# Patient Record
Sex: FEMALE | Employment: OTHER | URBAN - METROPOLITAN AREA
[De-identification: names, ages, dates, MRNs, and addresses within clinical notes are randomized per-mention and may not be internally consistent; named-entity substitution may affect disease eponyms.]

---

## 2020-04-04 ENCOUNTER — APPOINTMENT (OUTPATIENT)
Dept: GENERAL RADIOLOGY | Age: 70
End: 2020-04-04
Attending: EMERGENCY MEDICINE
Payer: MEDICARE

## 2020-04-04 ENCOUNTER — HOSPITAL ENCOUNTER (EMERGENCY)
Age: 70
Discharge: HOME | End: 2020-04-04
Attending: EMERGENCY MEDICINE
Payer: MEDICARE

## 2020-04-04 VITALS
SYSTOLIC BLOOD PRESSURE: 123 MMHG | HEART RATE: 81 BPM | RESPIRATION RATE: 16 BRPM | OXYGEN SATURATION: 99 % | WEIGHT: 124.12 LBS | DIASTOLIC BLOOD PRESSURE: 81 MMHG | BODY MASS INDEX: 20.68 KG/M2 | TEMPERATURE: 99.6 F | HEIGHT: 65 IN

## 2020-04-04 DIAGNOSIS — S52.91XA CLOSED FRACTURE OF DISTAL END OF RIGHT FOREARM, INITIAL ENCOUNTER: Primary | ICD-10-CM

## 2020-04-04 PROCEDURE — 99284 EMERGENCY DEPT VISIT MOD MDM: CPT

## 2020-04-04 PROCEDURE — 73110 X-RAY EXAM OF WRIST: CPT

## 2020-04-04 PROCEDURE — 74011000250 HC RX REV CODE- 250: Performed by: EMERGENCY MEDICINE

## 2020-04-04 PROCEDURE — 75810000302 HC ER LEVEL 2 CLOSED TREATMNT FRACTURE/DISLOCATION

## 2020-04-04 RX ORDER — BUPIVACAINE HYDROCHLORIDE 5 MG/ML
5 INJECTION, SOLUTION EPIDURAL; INTRACAUDAL ONCE
Status: COMPLETED | OUTPATIENT
Start: 2020-04-04 | End: 2020-04-04

## 2020-04-04 RX ORDER — HYDROCODONE BITARTRATE AND ACETAMINOPHEN 5; 325 MG/1; MG/1
1 TABLET ORAL
Qty: 8 TAB | Refills: 0 | Status: SHIPPED | OUTPATIENT
Start: 2020-04-04 | End: 2020-04-07

## 2020-04-04 RX ORDER — LIDOCAINE HYDROCHLORIDE 10 MG/ML
5 INJECTION, SOLUTION EPIDURAL; INFILTRATION; INTRACAUDAL; PERINEURAL ONCE
Status: COMPLETED | OUTPATIENT
Start: 2020-04-04 | End: 2020-04-04

## 2020-04-04 RX ADMIN — LIDOCAINE HYDROCHLORIDE 5 ML: 10 INJECTION, SOLUTION EPIDURAL; INFILTRATION; INTRACAUDAL; PERINEURAL at 20:24

## 2020-04-04 RX ADMIN — BUPIVACAINE HYDROCHLORIDE 25 MG: 5 INJECTION, SOLUTION EPIDURAL; INTRACAUDAL; PERINEURAL at 20:24

## 2020-04-04 NOTE — ED TRIAGE NOTES
Patient complains of right wrist pain after tripping over a rock causing her to fall and injure her right wrist.   Patient recently reports having a cold and treated by PCP with antibiotic

## 2020-04-04 NOTE — ED PROVIDER NOTES
HPI   52-year-old female presents after fall. Patient states she was walking outside with her grandson when she tripped and fell catching herself with her right hand. Patient is right-handed. Complains of pain 7/10 right posterior wrist, nonradiating worse, with range of motion. Patient also says she hit her chin on the ground. Denies head injury or loss of consciousness, denies neck pain, denies weakness numbness, denies malocclusion or dental injury. Patient denies any other complaints or injury. Patient states her tetanus is up-to-date. Past Medical History:   Diagnosis Date    Hypertension     Renal artery stenosis ([facility] [address]        History reviewed. No pertinent surgical history. History reviewed. No pertinent family history. Social History     Socioeconomic History    Marital status:      Spouse name: Not on file    Number of children: Not on file    Years of education: Not on file    Highest education level: Not on file   Occupational History    Not on file   Social Needs    Financial resource strain: Not on file    Food insecurity     Worry: Not on file     Inability: Not on file    Transportation needs     Medical: Not on file     Non-medical: Not on file   Tobacco Use    Smoking status: Never Smoker    Smokeless tobacco: Never Used   Substance and Sexual Activity    Alcohol use:  Yes    Drug use: Not on file    Sexual activity: Not on file   Lifestyle    Physical activity     Days per week: Not on file     Minutes per session: Not on file    Stress: Not on file   Relationships    Social connections     Talks on phone: Not on file     Gets together: Not on file     Attends Voodoo service: Not on file     Active member of club or organization: Not on file     Attends meetings of clubs or organizations: Not on file     Relationship status: Not on file    Intimate partner violence     Fear of current or ex partner: Not on file     Emotionally abused: Not on file Physically abused: Not on file     Forced sexual activity: Not on file   Other Topics Concern    Not on file   Social History Narrative    Not on file         ALLERGIES: Patient has no known allergies. Review of Systems   Constitutional: Negative for chills and fever. Respiratory: Negative for cough and shortness of breath. Gastrointestinal: Negative for abdominal pain, nausea and vomiting. Genitourinary: Negative for dysuria and hematuria. Musculoskeletal: Positive for arthralgias. Skin: Positive for wound. Neurological: Negative for weakness, numbness and headaches. All other systems reviewed and are negative. There were no vitals filed for this visit.          Physical Exam   Physical Examination: General appearance - alert, well appearing, and in no distress, oriented to person, place, and time and normal appearing weight  Eyes - pupils equal and reactive, extraocular eye movements intact  HEENT-superficial abrasion to chin, no malocclusion  Neck - supple, no significant adenopathy, no pain with rom, no midline tenderness  Neurological - alert, oriented, normal speech, no focal findings or movement disorder noted  Musculoskeletal - no joint tenderness, deformity or swelling, swelling and tenderness to dorsum of right wrist, NVI with strong radial pulses and cap refill <2 sec  Extremities - peripheral pulses normal, no pedal edema, no clubbing or cyanosis  Skin - normal coloration and turgor, no rashes, no suspicious skin lesions noted  MDM  Number of Diagnoses or Management Options  Closed fracture of distal end of right forearm, initial encounter:      Amount and/or Complexity of Data Reviewed  Tests in the radiology section of CPT®: ordered and reviewed  Discuss the patient with other providers: yes (ortho)  Independent visualization of images, tracings, or specimens: yes    Patient Progress  Patient progress: improved         JOINT/FRACTURE REDUCTION  Date/Time: 4/4/2020 9:00 PM  Performed by: Bekah Yates MD  Authorized by: Bekah Yates MD     Consent:     Consent obtained:  Written    Consent given by:  Patient    Risks discussed:  Pain and vascular damage    Alternatives discussed:  No treatment, delayed treatment, alternative treatment, observation and referral  Universal protocol:     Procedure explained and questions answered to patient or proxy's satisfaction: yes      Relevant documents present and verified: yes      Test results available and properly labeled: yes      Imaging studies available: yes      Required blood products, implants, devices, and special equipment available: yes      Site/side marked: yes      Immediately prior to procedure, a time out was called: yes      Patient identity confirmed:  Verbally with patient, arm band and provided demographic data  Injury:     Injury location:  Forearm    Forearm injury location:  R forearm    Forearm fracture type: distal radius and ulnar styloid    Pre-procedure assessment:     Neurological function: normal      Distal perfusion: normal      Range of motion: normal    Anesthesia (see MAR for exact dosages):      Anesthesia method:  Nerve block and local infiltration    Local anesthetic:  Bupivacaine 0.5% w/o epi and lidocaine 1% w/o epi    Block location:  Hematoma block     Block needle gauge:  25 G    Block anesthetic:  Lidocaine 1% w/o epi and bupivacaine 0.5% w/o epi    Block technique:  Hematoma block    Block injection procedure:  Anatomic landmarks identified, incremental injection and anatomic landmarks palpated    Block outcome:  Anesthesia achieved  Procedure details:     Manipulation performed: yes      Skin traction used: yes      Skeletal traction used: yes      Pin inserted: no      Reduction successful: yes      X-ray confirmed reduction: yes      Immobilization:  Sling and splint    Splint type:  Sugar tong    Supplies used:  Elastic bandage, cotton padding and Ortho-Glass  Post-procedure assessment:     Neurological function: normal      Distal perfusion: normal      Range of motion: normal      Patient tolerance of procedure: Tolerated well, no immediate complications  Splint, Sugar Tong  Date/Time: 4/4/2020 9:00 PM  Performed by: Carley Fisher MD  Authorized by: Carley Fisher MD     Consent:     Consent obtained:  Verbal    Consent given by:  Patient    Risks discussed:  Discoloration, numbness, pain and swelling    Alternatives discussed:  No treatment, delayed treatment, alternative treatment, observation and referral  Universal protocol:     Procedure explained and questions answered to patient or proxy's satisfaction: yes      Relevant documents present and verified: yes      Test results available and properly labeled: yes      Imaging studies available: yes      Required blood products, implants, devices, and special equipment available: yes      Site/side marked: yes      Immediately prior to procedure a time out was called: yes      Patient identity confirmed:  Verbally with patient, arm band and provided demographic data  Pre-procedure details:     Sensation:  Normal  Procedure details:     Laterality:  Right    Location:  Arm    Arm:  R lower arm    Strapping: no      Splint type:  Sugar tong    Supplies:  Elastic bandage, Ortho-Glass and cotton padding  Post-procedure details:     Pain:  Improved    Sensation:  Normal    Patient tolerance of procedure: Tolerated well, no immediate complications      Discussed with Dr. Felecia Enciso ortho. After successful reduction patient will follow-up with Dr. Kimberly Ace.

## 2020-04-05 NOTE — ED NOTES
Splint applied by dr Johanna Pagan. The patient was discharged home by  Dr Adryan Rosales in stable condition. The patient is alert and oriented, in no respiratory distress and discharge vital signs obtained. The patient's diagnosis, condition and treatment were explained. The patient expressed understanding. One prescription was given. No work/school note given. A discharge plan has been developed. A  was not involved in the process. Aftercare instructions were given. Pt ambulatory out of the ED.

## 2020-04-05 NOTE — DISCHARGE INSTRUCTIONS
Patient Education        Broken Arm: Care Instructions  Your Care Instructions  Fractures can range from a small, hairline crack, to a bone or bones broken into two or more pieces. Your treatment depends on how bad the break is. Your doctor may have put your arm in a splint or cast to allow it to heal or to keep it stable until you see another doctor. It may take weeks or months for your arm to heal. You can help your arm heal with some care at home. You heal best when you take good care of yourself. Eat a variety of healthy foods, and don't smoke. You may have had a sedative to help you relax. You may be unsteady after having sedation. It can take a few hours for the medicine's effects to wear off. Common side effects of sedation include nausea, vomiting, and feeling sleepy or tired. The doctor has checked you carefully, but problems can develop later. If you notice any problems or new symptoms, get medical treatment right away. Follow-up care is a key part of your treatment and safety. Be sure to make and go to all appointments, and call your doctor if you are having problems. It's also a good idea to know your test results and keep a list of the medicines you take. How can you care for yourself at home? · If the doctor gave you a sedative:  ? For 24 hours, don't do anything that requires attention to detail, such as going to work, making important decisions, or signing any legal documents. It takes time for the medicine's effects to completely wear off.  ? For your safety, do not drive or operate any machinery that could be dangerous. Wait until the medicine wears off and you can think clearly and react easily. · Put ice or a cold pack on your arm for 10 to 20 minutes at a time. Try to do this every 1 to 2 hours for the next 3 days (when you are awake). Put a thin cloth between the ice and your cast or splint. Keep the cast or splint dry. · Follow the cast care instructions your doctor gives you.  If you have a splint, do not take it off unless your doctor tells you to. · Be safe with medicines. Take pain medicines exactly as directed. ? If the doctor gave you a prescription medicine for pain, take it as prescribed. ? If you are not taking a prescription pain medicine, ask your doctor if you can take an over-the-counter medicine. · Prop up your arm on pillows when you sit or lie down in the first few days after the injury. Keep the arm higher than the level of your heart. This will help reduce swelling. · Follow instructions for exercises to keep your arm strong. · Wiggle your fingers and wrist often to reduce swelling and stiffness. When should you call for help? Call 911 anytime you think you may need emergency care. For example, call if:    · You are very sleepy and you have trouble waking up.    Call your doctor now or seek immediate medical care if:    · You have new or worse nausea or vomiting.     · You have new or worse pain.     · Your hand or fingers are cool or pale or change color.     · Your cast or splint feels too tight.     · You have tingling, weakness, or numbness in your hand or fingers.    Watch closely for changes in your health, and be sure to contact your doctor if:    · You do not get better as expected.     · You have problems with your cast or splint. Where can you learn more? Go to http://susan-starr.info/  Enter N123 in the search box to learn more about \"Broken Arm: Care Instructions. \"  Current as of: June 26, 2019Content Version: 12.4  © 7399-1526 Healthwise, Incorporated. Care instructions adapted under license by [employer] (which disclaims liability or warranty for this information). If you have questions about a medical condition or this instruction, always ask your healthcare professional. [address] any warranty or liability for your use of this information.                 We hope that we have addressed all of your medical concerns. The examination and treatment you received in the Emergency Department were for an emergent problem and were not intended as complete care. It is important that you follow up with your healthcare provider(s) for ongoing care. If your symptoms worsen or do not improve as expected, and you are unable to reach your usual health care provider(s), you should return to the Emergency Department. Today's healthcare is undergoing tremendous change, and patient satisfaction surveys are one of the many tools to assess the quality of medical care. You may receive a survey from the [employer] regarding your experience in the Emergency Department. I hope that your experience has been completely positive, particularly the medical care that I provided. As such, please participate in the survey; anything less than excellent does not meet my expectations or intentions. [facility] and [address] [place] [address] participate in nationally recognized quality of care measures. If your blood pressure is greater than 120/80, as reported below, we urge that you seek medical care to address the potential of high blood pressure, commonly known as hypertension. Hypertension can be hereditary or can be caused by certain medical conditions, pain, stress, or \"white coat syndrome. \"       Please make an appointment with your health care provider(s) for follow up of your Emergency Department visit. VITALS:   Patient Vitals for the past 8 hrs:   Temp Pulse Resp BP SpO2   04/04/20 1956 99.6 °F (37.6 °C) 81 16 120/86 96 %          Thank you for allowing us to provide you with medical care today. We realize that you have many choices for your emergency care needs. Please choose us in the future for any continued health care needs. [address].   Office: [phone]            No results found for this or any previous visit (from the past 24 hour(s)). Xr Wrist Rt Ap/lat/obl Min 3v    Result Date: 4/4/2020  EXAM: XR WRIST RT AP/LAT/OBL MIN 3V INDICATION: post reduction. Acute right wrist fracture COMPARISON: Earlier same day. FINDINGS: Three  views of the right wrist demonstrate acute fractures of the distal radius and ulna. There is now neutral angulation of the radiocarpal joint post reduction. . There is associated soft tissue swelling. IMPRESSION: 1. Neutral angulation of the radiocarpal joint post reduction. Xr Wrist Rt Ap/lat/obl Min 3v    Result Date: 4/4/2020  EXAM: XR WRIST RT AP/LAT/OBL MIN 3V INDICATION: Trauma. Fall with right wrist pain COMPARISON: None. FINDINGS: 4  views of the right wrist demonstrate an acute transverse fracture through the distal radial metaphysis. The fracture does extend into the articular surface. There is impaction along the dorsal margin with resulting dorsal angulation of the radial carpal joint. There is a nondisplaced fracture of the ulnar styloid tip. There is soft tissue swelling. IMPRESSION: 1. Acute impacted intra-articular fracture of the distal radius 2. Nondisplaced fracture of the ulnar styloid tip.

## 2020-04-20 ENCOUNTER — HOSPITAL ENCOUNTER (OUTPATIENT)
Dept: PREADMISSION TESTING | Age: 70
Discharge: HOME | End: 2020-04-20
Payer: MEDICARE

## 2020-04-20 VITALS
OXYGEN SATURATION: 99 % | HEART RATE: 71 BPM | WEIGHT: 117.7 LBS | SYSTOLIC BLOOD PRESSURE: 126 MMHG | BODY MASS INDEX: 19.61 KG/M2 | RESPIRATION RATE: 20 BRPM | DIASTOLIC BLOOD PRESSURE: 73 MMHG | TEMPERATURE: 97.8 F | HEIGHT: 65 IN

## 2020-04-20 LAB
ANION GAP SERPL CALC-SCNC: 3 MMOL/L (ref 5–15)
BUN SERPL-MCNC: 18 MG/DL (ref 6–20)
BUN/CREAT SERPL: 30 (ref 12–20)
CALCIUM SERPL-MCNC: 8.8 MG/DL (ref 8.5–10.1)
CHLORIDE SERPL-SCNC: 105 MMOL/L (ref 97–108)
CO2 SERPL-SCNC: 32 MMOL/L (ref 21–32)
CREAT SERPL-MCNC: 0.6 MG/DL (ref 0.55–1.02)
GLUCOSE SERPL-MCNC: 86 MG/DL (ref 65–100)
POTASSIUM SERPL-SCNC: 3.6 MMOL/L (ref 3.5–5.1)
SODIUM SERPL-SCNC: 140 MMOL/L (ref 136–145)

## 2020-04-20 PROCEDURE — 93005 ELECTROCARDIOGRAM TRACING: CPT

## 2020-04-20 PROCEDURE — 80048 BASIC METABOLIC PNL TOTAL CA: CPT

## 2020-04-20 PROCEDURE — 36415 COLL VENOUS BLD VENIPUNCTURE: CPT

## 2020-04-20 RX ORDER — MELATONIN
1000
COMMUNITY

## 2020-04-20 RX ORDER — FLUTICASONE PROPIONATE 50 MCG
1 SPRAY, SUSPENSION (ML) NASAL
COMMUNITY

## 2020-04-20 NOTE — H&P
Preoperative Evaluation                     History and Physical with Surgical Risk Stratification     4/20/2020    CC: Fractured right wrist  Surgery: ORIF Right Wrist     HPI:   Magnus Benson is a 77 y.o. female referred for pre-operative evaluation by Dr. Ivonne Paez for surgery on 4/22/20. Ms. Diana Powers states she was walking on 4/4/20 and tripped and fell onto her right wrist. She was taken to [facility] ER where they reduced her wrist and placed her into a cast. She notes now the pain has reduced to a dull ache on some days. If she moves it wrong she will get a sharp pain. She has swelling in her fingers and finds it hard to bend them. She is RHD. The patient was evaluated in the surgeon's office and it was determined that the most appropriate plan of care is to proceed with surgical intervention. Patient's PCP Angus Phoenix MD    Review of Systems     Constitutional: Negative for chills and fever  HENT: Negative for congestion and sore throat  Eyes: negative for blurred vision and double vision  Respiratory: Negative for cough, shortness of breath and wheezing  Mouth: Negative for loose, broken or chipped teeth. Cardiovascular: Negative for chest pain and palpitations  Gastrointestinal: Negative for abdominal pain, constipation, diarrhea and nausea  Genitourinary: Negative for dysuria and hematuria  Musculoskeletal: Cast applied to right wrist, swollen fingers. Skin: Negative for rash, open wounds. Negative for bruises easily  Neurological: Negative for dizziness, tremors and headaches  Psychiatric: Negative for depression. The patient is not nervous/anxious.     Inherent Risk of Surgery     Surgical risk:  Intermediate  Low:  EIntermediate:   OrthopaedicHigh:    Patient Cardiac Risk Assessment     Revised Cardiac Risk Index (RCRI)    Rate if cardiac death, nonfatal MI, nonfatal cardiac arrest by number of risk factor- 0.4%    RODNEY/AHA 2007 Guidelines:   1) Surgery Emergency, Non-cardiac -> to surgery  2) If not, look at clinical predictors    Major Intermediate Minor   Abnormal EKG    Blood Thinner: NA    METS     >4 METS Climb a flight of stairs or a hill Walk on level ground at 4 mph Run a short distance Heavy work around house (scrub floors, move furniture)     Other Risk Factors:   Screening for ETOH use:  Done and low risk  Smoking status:   None    Personal or FH of bleeding problems:  No  Personal or FH of blood clots:  No  Personal or FH of anesthesia problems:   No    Pulmonary Risk:  Asthma or COPD:  No  Body mass index is 19.59 kg/m².   Known MARCO:  No  BUN normal    Past Medical, Surgical, Social History     Allergies: No Known Allergies    Past Medical History:   Diagnosis Date    Anxiety     Arthritis     Hypertension     Renal artery stenosis (HCC)     Wrist fracture, right 04/04/2020     Past Surgical History:   Procedure Laterality Date    HX CERVICAL FUSION  2012    S/P cervical fracture    HX COLONOSCOPY      HX TONSILLECTOMY      childhood     Social History     Tobacco Use    Smoking status: Never Smoker    Smokeless tobacco: Never Used   Substance Use Topics    Alcohol use: Yes     Comment: few drinks/month    Drug use: Never     Family History   Problem Relation Age of Onset    Deep Vein Thrombosis Neg Hx     Anesth Problems Neg Hx        Objective     Vitals:    04/20/20 0951   BP: 126/73   Pulse: 71   Resp: 20   Temp: 97.8 °F (36.6 °C)   SpO2: 99%   Weight: 53.4 kg (117 lb 11.2 oz)   Height: 5' 5\" (1.651 m)       Constitutional:  Appears well,  No Acute Distress, Vitals noted  Psychiatric:   Affect normal, Alert and Oriented to person/place/time    Eyes:   Pupils equally round and reactive, EOMI, conjunctiva clear, eyelids normal  ENT:   External ears and nose normal/lips, teeth normal, gums normal, TMs and Orophyarynx normal  Neck:   General inspection and Thyroid normal.  No abnormal cervical or supraclavicular nodes    Lungs:   Clear to auscultation, good respiratory effort  Heart: Ausculation normal.  Regular rhythm. No cardiac murmurs. No carotid bruits or palpable thrills  Chest wall normal  Musculoskeletal: Cast to wrist. Fingers on right hand 1+ edema  Extremities:  Good peripheral pulses  Skin:   Warm to palpation, without rashes, bruising, or suspicious lesions     Recent Results (from the past 72 hour(s))   METABOLIC PANEL, BASIC    Collection Time: 04/20/20 10:31 AM   Result Value Ref Range    Sodium 140 136 - 145 mmol/L    Potassium 3.6 3.5 - 5.1 mmol/L    Chloride 105 97 - 108 mmol/L    CO2 32 21 - 32 mmol/L    Anion gap 3 (L) 5 - 15 mmol/L    Glucose 86 65 - 100 mg/dL    BUN 18 6 - 20 MG/DL    Creatinine 0.60 0.55 - 1.02 MG/DL    BUN/Creatinine ratio 30 (H) 12 - 20      GFR est AA >60 >60 ml/min/1.73m2    GFR est non-AA >60 >60 ml/min/1.73m2    Calcium 8.8 8.5 - 10.1 MG/DL   EKG, 12 LEAD, INITIAL    Collection Time: 04/20/20 10:47 AM   Result Value Ref Range    Ventricular Rate 63 BPM    Atrial Rate 63 BPM    P-R Interval 174 ms    QRS Duration 86 ms    Q-T Interval 416 ms    QTC Calculation (Bezet) 425 ms    Calculated P Axis -88 degrees    Calculated R Axis 71 degrees    Calculated T Axis 60 degrees    Diagnosis       Unusual P axis, possible ectopic atrial rhythm  Abnormal ECG  When compared with ECG of 9/17/12  No significant change  Confirmed by Nimesh Forte MD., Mayito Saliva ([id number]) on 4/21/2020 9:39:37 AM         Assessment and Plan     Assessment/Plan:   1) Fractured right wrist  2) Pre-Operative Evaluation    Labs and EKG reviewed. Preoperative Clearance  Per RCRI, the patient has a 0.4% risk of cardiac death, nonfatal MI, nonfatal cardiac arrest based on no risk factors. Per ACC/AHA guidelines, patient is low risk for a(n) intermediate risk surgery and may proceed to planned surgery with the above noted risk.     Roxanne Chin NP

## 2020-04-20 NOTE — PERIOP NOTES
[address] , [address] [facility]   MAIN OR                                  [phone]   MAIN PRE OP                          [phone]                                                                                AMBULATORY PRE OP          [phone]  PRE-ADMISSION TESTING    [phone]   Surgery Date: Wednesday 4/22/20         Is surgery arrival time given by surgeon? NO  If NO, [facility] staff will call you between 3 and 7pm the day before your surgery with your arrival time. (If your surgery is on a Monday, we will call you the Friday before.)    Call [phone] after 7pm Monday-Friday if you did not receive this call. INSTRUCTIONS BEFORE YOUR SURGERY   When You  Arrive Arrive at the [address]nd [address] [facility] on the day of your surgery  Have your insurance card, photo ID, and any copayment (if needed)   Food   and   Drink NO food or drink after midnight the night before surgery    This means NO water, gum, mints, coffee, juice, etc.  No alcohol (beer, wine, liquor) 24 hours before and after surgery   Medications to   TAKE   Morning of Surgery MEDICATIONS TO TAKE THE MORNING OF SURGERY WITH A SIP OF WATER:    none   Medications  To  STOP      7 days before surgery  Non-Steroidal anti-inflammatory Drugs (NSAID's): for example, Ibuprofen (Advil, Motrin), Naproxen (Aleve)   Aspirin, if taking for pain    Herbal supplements, vitamins, and fish oil   Other:  (Pain medications not listed above, including Tylenol may be taken)   Blood  Thinners  If you take  Aspirin, Plavix, Coumadin, or any blood-thinning or anti-blood clot medicine, talk to the doctor who prescribed the medications for pre-operative instructions.    Bathing Clothing  Jewelry  Valuables      If you shower the morning of surgery, please do not apply anything to your skin (lotions, powders, deodorant, or makeup, especially mascara)   Follow Chlorhexidine Care Fusion body wash instructions provided to you during PAT appointment. Begin 3 days prior to surgery.  Do not shave or trim anywhere 24 hours before surgery   Wear your hair loose or down; no pony-tails, buns, or metal hair clips   Wear loose, comfortable, clean clothes   Wear glasses instead of contacts   Leave money, valuables, and jewelry, including body piercings, at home   Going Home - or Spending the Night  SAME-DAY SURGERY: You must have a responsible adult drive you home and stay with you 24 hours after surgery   ADMITS: If your doctor is keeping you in the hospital after surgery, leave personal belongings/luggage in your car until you have a hospital room number. Hospital discharge time is 12 noon  Drivers must be here before 12 noon unless you are told differently   Special Instructions Chlor-hex  wash reviewed, incentive spirometry/deep breathing, leg exercises to prevent DVT, s/s of infection & what to report to MD all reviewed with patient    Pt verified understanding by teach back and return demonstration     Follow all instructions so your surgery wont be cancelled. Please, be on time. If a situation occurs and you are delayed the day of surgery, call [phone] . If your physical condition changes (like a fever, cold, flu, etc.) call your surgeon. Home medication(s) reviewed and verified per patient list during PAT appointment. The patient was contacted  in person. The patient verbalizes understanding of all instructions and does not  need reinforcement.

## 2020-04-21 ENCOUNTER — ANESTHESIA EVENT (OUTPATIENT)
Dept: SURGERY | Age: 70
End: 2020-04-21
Payer: MEDICARE

## 2020-04-21 LAB
ATRIAL RATE: 63 BPM
CALCULATED P AXIS, ECG09: -88 DEGREES
CALCULATED R AXIS, ECG10: 71 DEGREES
CALCULATED T AXIS, ECG11: 60 DEGREES
DIAGNOSIS, 93000: NORMAL
P-R INTERVAL, ECG05: 174 MS
Q-T INTERVAL, ECG07: 416 MS
QRS DURATION, ECG06: 86 MS
QTC CALCULATION (BEZET), ECG08: 425 MS
VENTRICULAR RATE, ECG03: 63 BPM

## 2020-04-21 RX ORDER — NALOXONE HYDROCHLORIDE 0.4 MG/ML
0.2 INJECTION, SOLUTION INTRAMUSCULAR; INTRAVENOUS; SUBCUTANEOUS
Status: CANCELLED | OUTPATIENT
Start: 2020-04-21

## 2020-04-21 RX ORDER — HYDROMORPHONE HYDROCHLORIDE 1 MG/ML
.25-1 INJECTION, SOLUTION INTRAMUSCULAR; INTRAVENOUS; SUBCUTANEOUS
Status: CANCELLED | OUTPATIENT
Start: 2020-04-21

## 2020-04-21 RX ORDER — FLUMAZENIL 0.1 MG/ML
0.2 INJECTION INTRAVENOUS
Status: CANCELLED | OUTPATIENT
Start: 2020-04-21

## 2020-04-21 RX ORDER — SODIUM CHLORIDE, SODIUM LACTATE, POTASSIUM CHLORIDE, CALCIUM CHLORIDE 600; 310; 30; 20 MG/100ML; MG/100ML; MG/100ML; MG/100ML
125 INJECTION, SOLUTION INTRAVENOUS CONTINUOUS
Status: CANCELLED | OUTPATIENT
Start: 2020-04-21

## 2020-04-21 RX ORDER — LIDOCAINE HYDROCHLORIDE 10 MG/ML
0.1 INJECTION, SOLUTION EPIDURAL; INFILTRATION; INTRACAUDAL; PERINEURAL AS NEEDED
Status: CANCELLED | OUTPATIENT
Start: 2020-04-21

## 2020-04-21 RX ORDER — DIPHENHYDRAMINE HYDROCHLORIDE 50 MG/ML
12.5 INJECTION, SOLUTION INTRAMUSCULAR; INTRAVENOUS AS NEEDED
Status: CANCELLED | OUTPATIENT
Start: 2020-04-21 | End: 2020-04-21

## 2020-04-21 RX ORDER — SODIUM CHLORIDE, SODIUM LACTATE, POTASSIUM CHLORIDE, CALCIUM CHLORIDE 600; 310; 30; 20 MG/100ML; MG/100ML; MG/100ML; MG/100ML
125 INJECTION, SOLUTION INTRAVENOUS CONTINUOUS
Status: CANCELLED | OUTPATIENT
Start: 2020-04-21 | End: 2020-04-22

## 2020-04-21 NOTE — PERIOP NOTES
Only a portion of the patient's surgical orders were received via fax. The page was received incomplete and unable to read a majority of it. Left a VM suki Khoury at Dr. Gross Pulling office requesting that the orders be refaxed to PAT if before 1100 or to the Main pre-op if after 1100 today.   SZT:[id number]/89/7488

## 2020-04-22 ENCOUNTER — ANESTHESIA (OUTPATIENT)
Dept: SURGERY | Age: 70
End: 2020-04-22
Payer: MEDICARE

## 2020-04-22 ENCOUNTER — APPOINTMENT (OUTPATIENT)
Dept: GENERAL RADIOLOGY | Age: 70
End: 2020-04-22
Attending: ORTHOPAEDIC SURGERY
Payer: MEDICARE

## 2020-04-22 ENCOUNTER — HOSPITAL ENCOUNTER (OUTPATIENT)
Age: 70
Setting detail: OUTPATIENT SURGERY
Discharge: HOME | End: 2020-04-22
Attending: ORTHOPAEDIC SURGERY | Admitting: ORTHOPAEDIC SURGERY
Payer: MEDICARE

## 2020-04-22 VITALS
RESPIRATION RATE: 11 BRPM | BODY MASS INDEX: 18.68 KG/M2 | SYSTOLIC BLOOD PRESSURE: 106 MMHG | OXYGEN SATURATION: 100 % | HEART RATE: 69 BPM | DIASTOLIC BLOOD PRESSURE: 64 MMHG | TEMPERATURE: 97.8 F | WEIGHT: 112.25 LBS

## 2020-04-22 DIAGNOSIS — S52.551D CLOSED EXTRAARTICULAR FRACTURE OF DISTAL RADIUS, RIGHT, WITH ROUTINE HEALING, SUBSEQUENT ENCOUNTER: Primary | ICD-10-CM

## 2020-04-22 PROCEDURE — 76060000034 HC ANESTHESIA 1.5 TO 2 HR: Performed by: ORTHOPAEDIC SURGERY

## 2020-04-22 PROCEDURE — 77030000032 HC CUF TRNQT ZIMM -B: Performed by: ORTHOPAEDIC SURGERY

## 2020-04-22 PROCEDURE — 77030008829 HC WRE K ACMD -B: Performed by: ORTHOPAEDIC SURGERY

## 2020-04-22 PROCEDURE — 77030010777 HC CRDL FT DERY -B

## 2020-04-22 PROCEDURE — 77030040356 HC CORD BPLR FRCP COVD -A: Performed by: ORTHOPAEDIC SURGERY

## 2020-04-22 PROCEDURE — 74011250636 HC RX REV CODE- 250/636: Performed by: NURSE ANESTHETIST, CERTIFIED REGISTERED

## 2020-04-22 PROCEDURE — 76210000020 HC REC RM PH II FIRST 0.5 HR: Performed by: ORTHOPAEDIC SURGERY

## 2020-04-22 PROCEDURE — 74011250636 HC RX REV CODE- 250/636: Performed by: ORTHOPAEDIC SURGERY

## 2020-04-22 PROCEDURE — C1713 ANCHOR/SCREW BN/BN,TIS/BN: HCPCS | Performed by: ORTHOPAEDIC SURGERY

## 2020-04-22 PROCEDURE — A4565 SLINGS: HCPCS | Performed by: ORTHOPAEDIC SURGERY

## 2020-04-22 PROCEDURE — 77030021122 HC SPLNT MAT FST BSNM -A: Performed by: ORTHOPAEDIC SURGERY

## 2020-04-22 PROCEDURE — 74011000250 HC RX REV CODE- 250: Performed by: NURSE ANESTHETIST, CERTIFIED REGISTERED

## 2020-04-22 PROCEDURE — 77030039497 HC CST PAD STERILE CHCS -A: Performed by: ORTHOPAEDIC SURGERY

## 2020-04-22 PROCEDURE — 74011000272 HC RX REV CODE- 272: Performed by: ORTHOPAEDIC SURGERY

## 2020-04-22 PROCEDURE — 76010000162 HC OR TIME 1.5 TO 2 HR INTENSV-TIER 1: Performed by: ORTHOPAEDIC SURGERY

## 2020-04-22 PROCEDURE — 77030003601 HC NDL NRV BLK BBMI -A: Performed by: ANESTHESIOLOGY

## 2020-04-22 PROCEDURE — 74011000250 HC RX REV CODE- 250: Performed by: ORTHOPAEDIC SURGERY

## 2020-04-22 PROCEDURE — 76210000006 HC OR PH I REC 0.5 TO 1 HR: Performed by: ORTHOPAEDIC SURGERY

## 2020-04-22 PROCEDURE — 77030040922 HC BLNKT HYPOTHRM STRY -A

## 2020-04-22 PROCEDURE — 77030002986 HC SUT PROL J&J -A: Performed by: ORTHOPAEDIC SURGERY

## 2020-04-22 PROCEDURE — 77030040361 HC SLV COMPR DVT MDII -B

## 2020-04-22 PROCEDURE — 77030031139 HC SUT VCRL2 J&J -A: Performed by: ORTHOPAEDIC SURGERY

## 2020-04-22 PROCEDURE — 77030003737 HC BIT DRL ACMD -C: Performed by: ORTHOPAEDIC SURGERY

## 2020-04-22 DEVICE — 3.5MM X 10MM NON-LOCKING HEXALOBE SCREW
Type: IMPLANTABLE DEVICE | Site: RADIUS | Status: FUNCTIONAL
Brand: ACUMED

## 2020-04-22 DEVICE — 2.3MM X 18MM LOCKING CORTICAL PEG
Type: IMPLANTABLE DEVICE | Site: RADIUS | Status: FUNCTIONAL
Brand: ACUMED

## 2020-04-22 DEVICE — 2.3MM X 16MM LOCKING CORTICAL PEG
Type: IMPLANTABLE DEVICE | Site: RADIUS | Status: FUNCTIONAL
Brand: ACUMED

## 2020-04-22 DEVICE — 3.5MM X 12MM LOCKING HEXALOBE SCREW
Type: IMPLANTABLE DEVICE | Site: RADIUS | Status: FUNCTIONAL
Brand: ACUMED

## 2020-04-22 DEVICE — 2.3MM X 20MM LOCKING CORTICAL PEG
Type: IMPLANTABLE DEVICE | Site: RADIUS | Status: FUNCTIONAL
Brand: ACUMED

## 2020-04-22 RX ORDER — FENTANYL CITRATE 50 UG/ML
INJECTION, SOLUTION INTRAMUSCULAR; INTRAVENOUS AS NEEDED
Status: DISCONTINUED | OUTPATIENT
Start: 2020-04-22 | End: 2020-04-22 | Stop reason: HOSPADM

## 2020-04-22 RX ORDER — PROPOFOL 10 MG/ML
INJECTION, EMULSION INTRAVENOUS
Status: DISCONTINUED | OUTPATIENT
Start: 2020-04-22 | End: 2020-04-22 | Stop reason: HOSPADM

## 2020-04-22 RX ORDER — ROPIVACAINE HYDROCHLORIDE 5 MG/ML
INJECTION, SOLUTION EPIDURAL; INFILTRATION; PERINEURAL AS NEEDED
Status: DISCONTINUED | OUTPATIENT
Start: 2020-04-22 | End: 2020-04-22 | Stop reason: HOSPADM

## 2020-04-22 RX ORDER — EPHEDRINE SULFATE/0.9% NACL/PF 50 MG/5 ML
SYRINGE (ML) INTRAVENOUS AS NEEDED
Status: DISCONTINUED | OUTPATIENT
Start: 2020-04-22 | End: 2020-04-22 | Stop reason: HOSPADM

## 2020-04-22 RX ORDER — LIDOCAINE HYDROCHLORIDE 20 MG/ML
INJECTION, SOLUTION EPIDURAL; INFILTRATION; INTRACAUDAL; PERINEURAL AS NEEDED
Status: DISCONTINUED | OUTPATIENT
Start: 2020-04-22 | End: 2020-04-22 | Stop reason: HOSPADM

## 2020-04-22 RX ORDER — MIDAZOLAM HYDROCHLORIDE 1 MG/ML
INJECTION, SOLUTION INTRAMUSCULAR; INTRAVENOUS AS NEEDED
Status: DISCONTINUED | OUTPATIENT
Start: 2020-04-22 | End: 2020-04-22 | Stop reason: HOSPADM

## 2020-04-22 RX ORDER — SODIUM CHLORIDE, SODIUM LACTATE, POTASSIUM CHLORIDE, CALCIUM CHLORIDE 600; 310; 30; 20 MG/100ML; MG/100ML; MG/100ML; MG/100ML
INJECTION, SOLUTION INTRAVENOUS
Status: DISCONTINUED | OUTPATIENT
Start: 2020-04-22 | End: 2020-04-22 | Stop reason: HOSPADM

## 2020-04-22 RX ORDER — OXYCODONE AND ACETAMINOPHEN 5; 325 MG/1; MG/1
1 TABLET ORAL
Qty: 30 TAB | Refills: 0 | Status: SHIPPED | OUTPATIENT
Start: 2020-04-22 | End: 2020-04-27

## 2020-04-22 RX ADMIN — ROPIVACAINE HYDROCHLORIDE 5 ML: 5 INJECTION, SOLUTION EPIDURAL; INFILTRATION; PERINEURAL at 09:51

## 2020-04-22 RX ADMIN — SODIUM CHLORIDE, POTASSIUM CHLORIDE, SODIUM LACTATE AND CALCIUM CHLORIDE: 600; 310; 30; 20 INJECTION, SOLUTION INTRAVENOUS at 09:23

## 2020-04-22 RX ADMIN — ROPIVACAINE HYDROCHLORIDE 5 ML: 5 INJECTION, SOLUTION EPIDURAL; INFILTRATION; PERINEURAL at 09:58

## 2020-04-22 RX ADMIN — ROPIVACAINE HYDROCHLORIDE 5 ML: 5 INJECTION, SOLUTION EPIDURAL; INFILTRATION; PERINEURAL at 09:54

## 2020-04-22 RX ADMIN — ROPIVACAINE HYDROCHLORIDE 5 ML: 5 INJECTION, SOLUTION EPIDURAL; INFILTRATION; PERINEURAL at 09:56

## 2020-04-22 RX ADMIN — LIDOCAINE HYDROCHLORIDE 30 MG: 20 INJECTION, SOLUTION INTRAVENOUS at 12:39

## 2020-04-22 RX ADMIN — ROPIVACAINE HYDROCHLORIDE 5 ML: 5 INJECTION, SOLUTION EPIDURAL; INFILTRATION; PERINEURAL at 09:45

## 2020-04-22 RX ADMIN — CEFAZOLIN SODIUM 2 G: 1 POWDER, FOR SOLUTION INTRAMUSCULAR; INTRAVENOUS at 11:04

## 2020-04-22 RX ADMIN — FENTANYL CITRATE 50 MCG: 50 INJECTION, SOLUTION INTRAMUSCULAR; INTRAVENOUS at 09:41

## 2020-04-22 RX ADMIN — FENTANYL CITRATE 50 MCG: 50 INJECTION, SOLUTION INTRAMUSCULAR; INTRAVENOUS at 10:57

## 2020-04-22 RX ADMIN — PROPOFOL 50 MCG/KG/MIN: 10 INJECTION, EMULSION INTRAVENOUS at 10:57

## 2020-04-22 RX ADMIN — Medication 10 MG: at 11:46

## 2020-04-22 RX ADMIN — ROPIVACAINE HYDROCHLORIDE 5 ML: 5 INJECTION, SOLUTION EPIDURAL; INFILTRATION; PERINEURAL at 09:48

## 2020-04-22 RX ADMIN — MIDAZOLAM HYDROCHLORIDE 2 MG: 2 INJECTION, SOLUTION INTRAMUSCULAR; INTRAVENOUS at 09:41

## 2020-04-22 NOTE — ANESTHESIA PROCEDURE NOTES
Peripheral Block    Start time: 4/22/2020 9:41 AM  End time: 4/22/2020 9:51 AM  Performed by: Eric Murillo CRNA  Authorized by: Richard Gill DO       Pre-procedure:   Preanesthetic Checklist: patient identified, risks and benefits discussed, site marked, timeout performed, anesthesia consent given and patient being monitored    Timeout Time: 09:41          Block Type:   Block Type:  Infraclavicular  Laterality:  Right  Monitoring:  Standard ASA monitoring, responsive to questions, oxygen, continuous pulse ox, frequent vital sign checks and heart rate  Injection Technique:  Single shot  Procedures: ultrasound guided and nerve stimulator    Patient Position: supine  Prep: chlorhexidine    : infraclavicular.   Needle Type:  Stimuplex  Needle Gauge:  22 G  Needle Localization:  Nerve stimulator, ultrasound guidance, infiltration and anatomical landmarks  Motor Response: minimal motor response >0.4 mA      Assessment:  Number of attempts:  1  Injection Assessment:  Incremental injection every 5 mL, local visualized surrounding nerve on ultrasound, negative aspiration for blood, no intravascular symptoms, ultrasound image on chart and no paresthesia  Patient tolerance:  Patient tolerated the procedure well with no immediate complications

## 2020-04-22 NOTE — ANESTHESIA POSTPROCEDURE EVALUATION
Procedure(s):  RIGHT DISTAL RADIUS ULNA OPEN REDUCTION INTERNAL FIXATION (AXILLARY BLOCK) (ESSENTIAL). regional    Anesthesia Post Evaluation      Multimodal analgesia: multimodal analgesia not used between 6 hours prior to anesthesia start to PACU discharge  Patient location during evaluation: PACU  Patient participation: complete - patient participated  Level of consciousness: awake and alert  Pain score: 0  Pain management: adequate  Airway patency: patent  Anesthetic complications: no  Cardiovascular status: hemodynamically stable and acceptable  Respiratory status: acceptable  Hydration status: acceptable  Comments: Patient seen and evaluated; no concerns. Block intact  Post anesthesia nausea and vomiting:  none      Vitals Value Taken Time   /65 4/22/2020  1:25 PM   Temp 36.6 °C (97.8 °F) 4/22/2020 12:57 PM   Pulse 65 4/22/2020  1:26 PM   Resp 10 4/22/2020  1:26 PM   SpO2 100 % 4/22/2020  1:26 PM   Vitals shown include unvalidated device data.

## 2020-04-22 NOTE — PERIOP NOTES
Block completed on right arm by Dr Karthik Gonzalez and VSS. Cast cut off by PA at bedside.   Pt stable

## 2020-04-22 NOTE — ANESTHESIA PREPROCEDURE EVALUATION
Relevant Problems   No relevant active problems       Anesthetic History   No history of anesthetic complications            Review of Systems / Medical History  Patient summary reviewed, nursing notes reviewed and pertinent labs reviewed    Pulmonary  Within defined limits                 Neuro/Psych         Psychiatric history    Comments: Anxiety/depression Cardiovascular    Hypertension: well controlled              Exercise tolerance: >4 METS  Comments: Renal artery stenosis   GI/Hepatic/Renal  Within defined limits              Endo/Other        Arthritis     Other Findings   Comments: Fall injury            Physical Exam    Airway  Mallampati: II    Neck ROM: normal range of motion   Mouth opening: Normal     Cardiovascular    Rhythm: regular  Rate: normal         Dental  No notable dental hx       Pulmonary  Breath sounds clear to auscultation               Abdominal         Other Findings            Anesthetic Plan    ASA: 2  Anesthesia type: regional - supraclavicular block            Anesthetic plan and risks discussed with: Patient      Supraclavicular versus infraclavicular discussed. Informed consent obtained.

## 2020-04-22 NOTE — DISCHARGE INSTRUCTIONS
Hand/Wrist/Elbow Surgery  Post op Instruction  [phone]    Dressing    You have bulky dressing on your hand      __X___ Do NOT remove dressing until next appointment with Dr. Imelda Burgos or Occupational therapy (OT) please call Dr. Alexandro Cheng office if this is not already arranged         OR    _____ Leslie Thurston may remove the dressing from your hand in _____ days after surgery    - Please cover the wound with a Band-Aid (NOT waterproof type) and change daily. - After your dressing is off, do not do any strenuous activities with your hand/wrist until seen by OT or Dr. Imelda Burgos within 2 weeks postoperatively. Please call Dr. Alexandro Cheng office if this is not already arranged. Showering    You may shower as soon as you want after surgery. Please cover the dressing with a bag. If you are allowed to remove your dressing you may shower and get the wound wet 3 days after your surgery. You may allow the water to run over the incision and pat the incision dry. DO NOT let the wound soak in a tub, pool, or dish water. ICE & ELEVATE    The use of ice on your hand, wrist, or elbow after surgery will help with both pain control and swelling. Continue with icing your hand, wrist or elbow for the first 48 hours after surgery. Thereafter, use it on an as needed basis. Elevate your hand above heart level as much as possible for the first 48 hours, even while walking. This will keep the swelling to a minimum and prevent throbbing and pain. Pain Medication    If you received a regional anesthetic block your arm and hand maybe numb for several hours (6-24 hours). You will be discharged from the surgery center to home with an oral pain medication (analgesic). Rest and elevation is still one of the most important factor for pain control. Take pain medication as directed even though the pain is minimal with the block; do not wait for the pain to become out of control. The most severe pain occurs as the block wears off. Even if you are not having pain but feel the arm and hand waking up, take your pain medication so that it will be working when needed. Pain medication may cause some lethargy, nausea, or constipation. If these symptoms become significantly problematic, please contact Dr. Porsche Live office and discontinue the medication. Diet    Gradually resume your normal diet. The night of your surgery, begin with liquids and/or light foods. If you are feeling well enough in the morning, progress to your normal eating patterns. Eating a well-balanced diet with plenty of fresh fruits and vegetables and drinking plenty of fluids may help alleviate any constipation resulting from pain medication. Driving    It is not advisable to drive a vehicle while you are on pain medication due to the possible side effects. However, once you are off pain medication and you feel that you are able to safely control the vehicle, you may drive      Warning Signs    Observe your hand and incision site for increased redness, inflammation, drainage, foul odor, or increased pain unrelieved by rest or medication. If any of the above occurs, or should you develop a fever greater than 101 degrees, please notify Dr. Porsche Live office. Appointments    You should already have an appointment to see Dr. Montgomery Halsted within 2 weeks. If you do not have this appointment, please contact our office. Do not hesitate to call with any questions or concerns.     Your post-operative appointment has been made for the following date and time with Dr. Montgomery Halsted:    Date:     ______5/4/20_____________       Time:    ______10am___________    Location:   [facility] ____________  [address] [facility] ____X________    Hand Therapy Date:  ___5/4/20___________  Time: ______to follow__________    Location:  [address] [place] [address] ______________  Sarah Khoury ____X_________      Dr. Porsche Live office numbers are:      [phone] or [phone], ext [phone] Alyssa Skyler After business hours or holidays, please call the numbers above and speak with the physician on call   If life threatening emergency call Kurt Hernandez from your Nurse    The following personal items collected during your admission are returned to you:   Dental Appliance: Dental Appliances: None  Vision:    Hearing Aid:    Jewelry:    Clothing:    Other Valuables:    Valuables sent to safe:      PATIENT INSTRUCTIONS:    After general anesthesia or intravenous sedation, for 24 hours or while taking prescription Narcotics:  · Limit your activities  · Do not drive and operate hazardous machinery  · Do not make important personal or business decisions  · Do  not drink alcoholic beverages  · If you have not urinated within 8 hours after discharge, please contact your surgeon on call. Report the following to your surgeon:  · Excessive pain, swelling, redness or odor of or around the surgical area  · Temperature over 100.5  · Nausea and vomiting lasting longer than 4 hours or if unable to take medications  · Any signs of decreased circulation or nerve impairment to extremity: change in color, persistent  numbness, tingling, coldness or increase pain  · Any questions    COUGH AND DEEP BREATHE    Breathing deep and coughing are very important exercises to do after surgery. Deep breathing and coughing open the little air tubes and air sacks in your lungs. You take deep breaths every day. You may not even notice - it is just something you do when you sigh or yawn. It is a natural exercise you do to keep these air passages open. After surgery, take deep breaths and cough, on purpose. Coughing and deep breathing help prevent bronchitis and pneumonia after surgery. If you had chest or belly surgery, use a pillow as a \"hug buddy\" and hold it tightly to your chest or belly when you cough. DIRECTIONS:  · Take 10 to 15 slow deep breaths every hour while awake.   · Breathe in deeply, and hold it for 2 seconds. · Exhale slowly through puckered lips, like blowing up a balloon. · After every 4th or 5th deep breath, hug your pillow to your chest or belly and give a hard, deep cough. Yes, it will probably hurt. But doing this exercise is very important part of healing after surgery. Take your pain medicine to help you do this exercise without too much pain. IF YOU HAVE BEEN DIAGNOSED WITH SLEEP APNEA, PLEASE USE YOUR SLEEP APNEA DEVICE OR CPAP MACHINE WHEN YOU INTEND TO NAP AFTER TAKING PAIN MEDICATION. Ankle Pumps    Ankle pumps increase the circulation of oxygenated blood to your lower extremities and decrease your risk for circulation problems such as blood clots. They also stretch the muscles, tendons and ligaments in your foot and ankle, and prevent joint contracture in the ankle and foot, especially after surgeries on the legs. It is important to do ankle pump exercises regularly after surgery because immobility increases your risk for developing a blood clot. Your doctor may also have you take an Aspirin for the next few days as well. If your doctor did not ask you to take an Aspirin, consult with him before starting Aspirin therapy on your own. Slowly point your foot forward, feeling the muscles on the top of your lower leg stretch, and hold this position for 5 seconds. Next, pull your foot back toward you as far as possible, stretching the calf muscles, and hold that position for 5 seconds. Repeat with the other foot. Perform 10 repetitions every hour while awake for both ankles if possible (down and then up with the foot once is one repetition). You should feel gentle stretching of the muscles in your lower leg when doing this exercise. If you feel pain, or your range of motion is limited, don't  Push too hard. Only go the limit your joint and muscles will let you go.   If you have increasing pain, progressively worsening leg warmth or swelling, STOP the exercise and call your doctor. Below is information about the medications your doctor is prescribing after your visit:    Other information in your discharge envelope:  []     PRESCRIPTIONS  []     SCHOOL/WORK NOTE  []     INFECTION PREVENTION  []     [address]  []     REGIONAL NERVE BLOCK ON QUE PAMPHLET   []     EXPAREL  []     HANDICAP APPLICATION         These are general instructions for a healthy lifestyle:    *  Please give a list of your current medications to your Primary Care Provider. *  Please update this list whenever your medications are discontinued, doses are      changed, or new medications (including over-the-counter products) are added. *  Please carry medication information at all times in case of emergency situations. About Smoking  No smoking / No tobacco products / Avoid exposure to second hand smoke    Surgeon General's Warning:  Quitting smoking now greatly reduces serious risk to your health. Obesity, smoking, and sedentary lifestyle greatly increases your risk for illness and disease. A healthy diet, regular physical exercise & weight monitoring are important for maintaining a healthy lifestyle. Congestive Heart Failure  You may be retaining fluid if you have a history of heart failure or if you experience any of the following symptoms:  Weight gain of 3 pounds or more overnight or 5 pounds in a week, increased swelling in our hands or feet or shortness of breath while lying flat in bed. Please call your doctor as soon as you notice any of these symptoms; do not wait until your next office visit. [facility] MEDICATION AND SIDE EFFECT GUIDE    The [facility] MEDICATION AND SIDE EFFECT GUIDE was provided to the PATIENT AND CARE PROVIDER.   Information provided includes instruction about drug purpose and common side effects for the following medications:    · PERCOCET

## 2020-04-22 NOTE — PERIOP NOTES
Pt with a cast on right arm. Cap refill <3 seconds, color normal pink. Pt without pain.  Some broken capillaries on legs

## 2020-04-23 NOTE — OP NOTES
[facility]  OPERATIVE REPORT    Name:  Irene Palacios  MR#:  [id number]  :  1950  ACCOUNT #:  [de-identified]  DATE OF SERVICE:  2020    PREOPERATIVE DIAGNOSIS:  Right distal radius comminuted intraarticular fracture. POSTOPERATIVE DIAGNOSIS:  Right distal radius comminuted intraarticular fracture. PROCEDURE PERFORMED:  Right distal radius open reduction and internal fixation, 3+ part. SURGEON:  Ahsan Maya MD    ASSISTANT:  MAURICIO Asher    ANESTHESIA:  Regional block. COMPLICATIONS:  None    SPECIMENS REMOVED:  There were no specimens. IMPLANTS:  Hardware is the Acumed volar distal radius plate. ESTIMATED BLOOD LOSS:  Minimal.    DRAINS:  No drains were used. DISPOSITION:  The patient was returned to the PACU in stable condition. INDICATION:  This is a 61-year-old right-hand dominant female who injured herself initially on 2020, when she tripped and fell on her wrist.  Initially, we attempted to treat her conservatively. However, she demonstrated increased dorsal angulation at her fracture site. There was intraarticular component involving the dorsal radial side. After risks, benefits and alternatives were discussed with the patient, she elected to proceed with the aforementioned procedure. PROCEDURE:  The patient was identified in the preoperative holding area. Right extremity was signed. She underwent a block by the Anesthesia staff and was brought back to the operating room. A formal time-out was called to identify the correct surgical site. She did receive preoperative antibiotics half an hour within the incision time. After the arm was prepped and draped in normal sterile fashion, a tourniquet was inflated up to 250 mmHg for a total tourniquet time of slightly over 90 minutes. A standard FCR approach was used to gain access to the distal radius coming through the skin and subcutaneous tissue. Skin hooks were placed.   The FCR tendon was identified and released from its sheaths and retracted in an ulnar direction. The radial artery was found proximally, and we dissected distally. Keeping the radial artery retracted in the radial direction, we found the FPL muscle belly and tendon, retracted this ulnarly. We released the pronator quadratus from the radial side past the fracture site and found the transition zone, keeping our soft tissue protected, came through the transition zone and lifted up the pronator quadratus using a periosteal elevator. She had started healing her fracture. We were able to identify the fracture line and released this. We put in a [place] elevator, and we were able to elevate particularly on the dorsal ulnar side. We then brought in fluoroscopy and overall, the dorsal ulnar piece was lining up well. We then mainly had to restore her volar tilt between the dorsal ulnar piece and the volar pieces and the shaft. We placed a volar plate distally, put two K-wires in to make sure that we were not intraarticular and once we had this, we were able to reduce our fracture and put a hole proximally to restore our volar tilt. We drilled, measured, and filled our holes distally into plate and adjusted the height by loosening up the screw in the oblong hole, and restored our height also. We then put in the two remaining shaft screws. We then obtained final fluoroscopy shots both in PA, lateral and 10-degree tilt views, which demonstrated acceptable reduction of the fracture. No evidence of intraarticular penetration of the distal pegs. The DRUJ was felt to be stable. The wound was thoroughly irrigated out. The pronator quadratus was closed over the plate using 3-0 Vicryl. The subcutaneous tissue was closed with 4-0 Vicryl, and the skin with 4-0 Prolene. Xeroform, 4x4's, cast padding, and a volar splint was placed on the patient and allowed to harden. Tourniquet was deflated.   The patient was woken up in the operating and returned to the PACU in stable condition. Please note, physician assistant was scrubbed in for the entirety of the case and pertinent for retraction of vital structures and the fracture was exposed. She also helped to hold the fracture reduced as fixation was obtained. She also helped place the postoperative dressing.       Meli Gill MD      [id number]  D:  04/22/2020 12:51  T:  04/22/2020 21:11  JOB #:  [id number]

## 2020-06-02 NOTE — BRIEF OP NOTE
Brief Postoperative Note    Patient: Jeovany Quezada  YOB: 1950  MRN: [id number]    Date of Procedure: 4/22/2020     Pre-Op Diagnosis: RIGHT DISTAL RADIUS FRACTURE    Post-Op Diagnosis: RIGHT DISTAL RADIUS FRACTURE       Procedure(s):  RIGHT DISTAL RADIUS ULNA OPEN REDUCTION INTERNAL FIXATION (AXILLARY BLOCK) (ESSENTIAL)    Surgeon(s):  Pamela Burnham MD    Surgical Assistant: Physician Assistant: Yana Vidal PA-C    Anesthesia: Other     Estimated Blood Loss (mL): Minimal    Complications: None    Specimens: * No specimens in log *     Implants:   Implant Name Type Inv.  Item Serial No.  Lot No. LRB No. Used Action   PLATE VDR MERLE ACULOC 2 RT   NA [employer] NA Right 1 Implanted   SCR BNE NLCK HEXALOBE 3.5X12MM -- F/ELBOW PLT SYS - SNA  SCR BNE NLCK HEXALOBE 3.5X12MM -- F/ELBOW PLT SYS NA [employer] NA Right 1 Implanted   PEG BNE WILBERTO LCK KENISHA 2.3X20MM --  - SNA  PEG BNE WILBERTO LCK KENISHA 2.3X20MM --  NA [employer] NA Right 2 Implanted   PEG BNE WILBERTO LCK KENISHA 2.3X18MM --  - SNAN  PEG BNE WILBERTO LCK KENISHA 2.3X18MM --  NAN [employer] NA Right 3 Implanted   SCR BNE LCK HEXALOBE 3.5X12MM -- F/ELBOW PLT SYS - SNA  SCR BNE LCK HEXALOBE 3.5X12MM -- F/ELBOW PLT SYS NA [employer] NA Right 1 Implanted   PEG BNE WILBERTO LCK KENISHA 2.3X16MM --  - SNA  PEG BNE WILBERTO LCK KENISHA 2.3X16MM --  NA [employer] NA Right 1 Implanted   SCR BNE NLCK HEXALOBE 3.5X10MM -- F/ELBOW PLT SYS - SNA  SCR BNE NLCK HEXALOBE 3.5X10MM -- F/ELBOW PLT SYS NA [employer] NA Right 1 Implanted       Drains: None    Findings: Same as above    Electronically Signed by Diana Welch PA-C on 6/2/2020 at 11:48 AM

## 2022-08-25 ENCOUNTER — TRANSCRIBE ORDER (OUTPATIENT)
Dept: GENERAL RADIOLOGY | Age: 72
End: 2022-08-25

## 2022-08-25 ENCOUNTER — HOSPITAL ENCOUNTER (OUTPATIENT)
Dept: GENERAL RADIOLOGY | Age: 72
Discharge: HOME | End: 2022-08-25
Payer: MEDICARE

## 2022-08-25 DIAGNOSIS — M79.672 LEFT FOOT PAIN: Primary | ICD-10-CM

## 2022-08-25 DIAGNOSIS — M79.672 LEFT FOOT PAIN: ICD-10-CM

## 2022-08-25 PROCEDURE — 73630 X-RAY EXAM OF FOOT: CPT

## (undated) DEVICE — TOWEL,OR,DSP,ST,BLUE,STD,2/PK,40PK/CS: Brand: MEDLINE

## (undated) DEVICE — SUTURE VCRL SZ 4-0 L27IN ABSRB UD L17MM RB-1 1/2 CIR J214H

## (undated) DEVICE — 1010 S-DRAPE TOWEL DRAPE 10/BX: Brand: STERI-DRAPE™

## (undated) DEVICE — SPONGE GZ W4XL4IN COT 12 PLY TYP VII WVN C FLD DSGN

## (undated) DEVICE — SUTURE PROL SZ 4-0 L18IN NONABSORBABLE BLU L13MM P-3 3/8 8699G

## (undated) DEVICE — SKIN PREP TRAY W/CHG: Brand: MEDLINE INDUSTRIES, INC.

## (undated) DEVICE — .054" X 6" GUIDE WIRE: Brand: ACUMED

## (undated) DEVICE — HAND I-LF: Brand: MEDLINE INDUSTRIES, INC.

## (undated) DEVICE — GOWN,SIRUS,FABRNF,XL,20/CS: Brand: MEDLINE

## (undated) DEVICE — SUTURE VCRL SZ 3-0 L27IN ABSRB UD L17MM RB-1 1/2 CIR J215H

## (undated) DEVICE — TUBING SUCT 10FR MAL ALUM SHFT FN CAP VENT UNIV CONN W/ OBT

## (undated) DEVICE — INTENDED FOR TISSUE SEPARATION, AND OTHER PROCEDURES THAT REQUIRE A SHARP SURGICAL BLADE TO PUNCTURE OR CUT.: Brand: BARD-PARKER ® CARBON RIB-BACK BLADES

## (undated) DEVICE — DRESSING,GAUZE,XEROFORM,CURAD,5"X9",ST: Brand: CURAD

## (undated) DEVICE — 2.0MM QUICK RELEASE DRILL: Brand: ACUMED

## (undated) DEVICE — SLING ARM LIFETEC ORTH UNIV --

## (undated) DEVICE — PADDING CAST 4 INX5 YD STRL

## (undated) DEVICE — COVER LT HNDL PLAS RIG 1 PER PK

## (undated) DEVICE — BANDAGE COBAN 4 IN COMPR W4INXL5YD FOAM COHESIVE QUIK STK SELF ADH SFT

## (undated) DEVICE — DRAPE C ARM W54XL84IN MINI FOR OEC 6800

## (undated) DEVICE — 3.5MM X 12MM NON-LOCKING HEXALOBE SCREW
Type: IMPLANTABLE DEVICE | Site: RADIUS | Status: NON-FUNCTIONAL
Brand: ACUMED
Removed: 2020-04-22

## (undated) DEVICE — BIPOLAR FORCEPS CORD: Brand: VALLEYLAB

## (undated) DEVICE — DRAPE,REIN 53X77,STERILE: Brand: MEDLINE

## (undated) DEVICE — STRAP,POSITIONING,KNEE/BODY,FOAM,4X60": Brand: MEDLINE

## (undated) DEVICE — 2.8MM X 5" QUICK RELEASE DRILL: Brand: ACUMED

## (undated) DEVICE — SPLINT CAST W4XL15IN GRN STRENGTH PLSTR OF PARIS FAST SET

## (undated) DEVICE — BNDG ELAS HK LOOP 4X5YD NS -- MATRIX

## (undated) DEVICE — INFECTION CONTROL KIT SYS

## (undated) DEVICE — CANISTER, RIGID, 3000CC: Brand: MEDLINE INDUSTRIES, INC.

## (undated) DEVICE — STERILE POLYISOPRENE POWDER-FREE SURGICAL GLOVES: Brand: PROTEXIS

## (undated) DEVICE — IMPERVIOUS SURGICAL GOWN, LG: Brand: CONVERTORS

## (undated) DEVICE — ZIMMER® STERILE DISPOSABLE TOURNIQUET CUFF WITH PROTECTIVE SLEEVE AND PLC, DUAL PORT, SINGLE BLADDER, 18 IN. (46 CM)

## (undated) DEVICE — TUBING, SUCTION, 1/4" X 10', STRAIGHT: Brand: MEDLINE